# Patient Record
Sex: FEMALE | URBAN - METROPOLITAN AREA
[De-identification: names, ages, dates, MRNs, and addresses within clinical notes are randomized per-mention and may not be internally consistent; named-entity substitution may affect disease eponyms.]

---

## 2020-04-21 DIAGNOSIS — Z01.84 ANTIBODY RESPONSE EXAMINATION: ICD-10-CM

## 2020-05-21 DIAGNOSIS — Z01.84 ANTIBODY RESPONSE EXAMINATION: ICD-10-CM

## 2020-06-20 DIAGNOSIS — Z01.84 ANTIBODY RESPONSE EXAMINATION: ICD-10-CM

## 2020-07-20 DIAGNOSIS — Z01.84 ANTIBODY RESPONSE EXAMINATION: ICD-10-CM

## 2020-08-19 DIAGNOSIS — Z01.84 ANTIBODY RESPONSE EXAMINATION: ICD-10-CM

## 2020-09-18 DIAGNOSIS — Z01.84 ANTIBODY RESPONSE EXAMINATION: ICD-10-CM

## 2020-10-18 DIAGNOSIS — Z01.84 ANTIBODY RESPONSE EXAMINATION: ICD-10-CM

## 2020-11-17 DIAGNOSIS — Z01.84 ANTIBODY RESPONSE EXAMINATION: ICD-10-CM

## 2021-12-22 ENCOUNTER — HOSPITAL ENCOUNTER (EMERGENCY)
Facility: OTHER | Age: 41
Discharge: HOME | End: 2021-12-22
Attending: EMERGENCY MEDICINE
Payer: MEDICAID

## 2021-12-22 VITALS
BODY MASS INDEX: 32.14 KG/M2 | OXYGEN SATURATION: 99 % | HEART RATE: 84 BPM | RESPIRATION RATE: 16 BRPM | TEMPERATURE: 99 F | HEIGHT: 66 IN | WEIGHT: 200 LBS | DIASTOLIC BLOOD PRESSURE: 70 MMHG | SYSTOLIC BLOOD PRESSURE: 132 MMHG

## 2021-12-22 DIAGNOSIS — R10.13 EPIGASTRIC ABDOMINAL PAIN: Primary | ICD-10-CM

## 2021-12-22 LAB
ALBUMIN SERPL BCP-MCNC: 3.6 G/DL (ref 3.5–5.2)
ALP SERPL-CCNC: 72 U/L (ref 55–135)
ALT SERPL W/O P-5'-P-CCNC: 16 U/L (ref 10–44)
ANION GAP SERPL CALC-SCNC: 11 MMOL/L (ref 8–16)
AST SERPL-CCNC: 17 U/L (ref 10–40)
BASOPHILS # BLD AUTO: 0.03 K/UL (ref 0–0.2)
BASOPHILS NFR BLD: 0.4 % (ref 0–1.9)
BILIRUB SERPL-MCNC: 0.6 MG/DL (ref 0.1–1)
BUN SERPL-MCNC: 6 MG/DL (ref 6–20)
CALCIUM SERPL-MCNC: 8.8 MG/DL (ref 8.7–10.5)
CHLORIDE SERPL-SCNC: 103 MMOL/L (ref 95–110)
CO2 SERPL-SCNC: 23 MMOL/L (ref 23–29)
CREAT SERPL-MCNC: 0.7 MG/DL (ref 0.5–1.4)
DIFFERENTIAL METHOD: ABNORMAL
EOSINOPHIL # BLD AUTO: 0 K/UL (ref 0–0.5)
EOSINOPHIL NFR BLD: 0.4 % (ref 0–8)
ERYTHROCYTE [DISTWIDTH] IN BLOOD BY AUTOMATED COUNT: 14.2 % (ref 11.5–14.5)
EST. GFR  (AFRICAN AMERICAN): >60 ML/MIN/1.73 M^2
EST. GFR  (NON AFRICAN AMERICAN): >60 ML/MIN/1.73 M^2
GLUCOSE SERPL-MCNC: 96 MG/DL (ref 70–110)
HCT VFR BLD AUTO: 43.3 % (ref 37–48.5)
HCV AB SERPL QL IA: NEGATIVE
HGB BLD-MCNC: 14 G/DL (ref 12–16)
HIV 1+2 AB+HIV1 P24 AG SERPL QL IA: NEGATIVE
IMM GRANULOCYTES # BLD AUTO: 0.06 K/UL (ref 0–0.04)
IMM GRANULOCYTES NFR BLD AUTO: 0.8 % (ref 0–0.5)
LIPASE SERPL-CCNC: 4 U/L (ref 4–60)
LYMPHOCYTES # BLD AUTO: 0.7 K/UL (ref 1–4.8)
LYMPHOCYTES NFR BLD: 9.3 % (ref 18–48)
MCH RBC QN AUTO: 30.1 PG (ref 27–31)
MCHC RBC AUTO-ENTMCNC: 32.3 G/DL (ref 32–36)
MCV RBC AUTO: 93 FL (ref 82–98)
MONOCYTES # BLD AUTO: 0.9 K/UL (ref 0.3–1)
MONOCYTES NFR BLD: 12 % (ref 4–15)
NEUTROPHILS # BLD AUTO: 5.4 K/UL (ref 1.8–7.7)
NEUTROPHILS NFR BLD: 77.1 % (ref 38–73)
NRBC BLD-RTO: 0 /100 WBC
PLATELET # BLD AUTO: 217 K/UL (ref 150–450)
PMV BLD AUTO: 11.2 FL (ref 9.2–12.9)
POTASSIUM SERPL-SCNC: 3.7 MMOL/L (ref 3.5–5.1)
PROT SERPL-MCNC: 7.1 G/DL (ref 6–8.4)
RBC # BLD AUTO: 4.65 M/UL (ref 4–5.4)
SODIUM SERPL-SCNC: 137 MMOL/L (ref 136–145)
WBC # BLD AUTO: 7.06 K/UL (ref 3.9–12.7)

## 2021-12-22 PROCEDURE — 80053 COMPREHEN METABOLIC PANEL: CPT | Performed by: EMERGENCY MEDICINE

## 2021-12-22 PROCEDURE — 85025 COMPLETE CBC W/AUTO DIFF WBC: CPT | Performed by: EMERGENCY MEDICINE

## 2021-12-22 PROCEDURE — 83690 ASSAY OF LIPASE: CPT | Performed by: EMERGENCY MEDICINE

## 2021-12-22 PROCEDURE — 86803 HEPATITIS C AB TEST: CPT | Performed by: EMERGENCY MEDICINE

## 2021-12-22 PROCEDURE — 99284 EMERGENCY DEPT VISIT MOD MDM: CPT | Mod: 25

## 2021-12-22 PROCEDURE — 36000 PLACE NEEDLE IN VEIN: CPT

## 2021-12-22 PROCEDURE — 25000003 PHARM REV CODE 250: Performed by: EMERGENCY MEDICINE

## 2021-12-22 PROCEDURE — 87389 HIV-1 AG W/HIV-1&-2 AB AG IA: CPT | Performed by: EMERGENCY MEDICINE

## 2021-12-22 RX ORDER — ONDANSETRON 4 MG/1
4 TABLET, ORALLY DISINTEGRATING ORAL
Status: COMPLETED | OUTPATIENT
Start: 2021-12-22 | End: 2021-12-22

## 2021-12-22 RX ORDER — SUCRALFATE 1 G/10ML
1 SUSPENSION ORAL
Status: COMPLETED | OUTPATIENT
Start: 2021-12-22 | End: 2021-12-22

## 2021-12-22 RX ORDER — FAMOTIDINE 20 MG/1
20 TABLET, FILM COATED ORAL 2 TIMES DAILY
Qty: 20 TABLET | Refills: 0 | Status: SHIPPED | OUTPATIENT
Start: 2021-12-22 | End: 2022-12-22

## 2021-12-22 RX ORDER — OMEPRAZOLE 20 MG/1
20 CAPSULE, DELAYED RELEASE ORAL DAILY
Qty: 30 CAPSULE | Refills: 0 | Status: SHIPPED | OUTPATIENT
Start: 2021-12-22 | End: 2022-01-21

## 2021-12-22 RX ADMIN — SUCRALFATE 1 G: 1 SUSPENSION ORAL at 09:12

## 2021-12-22 RX ADMIN — ALUMINUM HYDROXIDE, MAGNESIUM HYDROXIDE, DIMETHICONE 50 ML: 200; 200; 20 LIQUID ORAL at 08:12

## 2021-12-22 RX ADMIN — ONDANSETRON 4 MG: 4 TABLET, ORALLY DISINTEGRATING ORAL at 08:12

## 2021-12-22 NOTE — ED PROVIDER NOTES
"SCRIBE #1 NOTE: I, Little Bolanos, am scribing for, and in the presence of, Yosef Jarrell DO.       Source of History:  The patient.    Chief complaint:  Abdominal Pain (Abdominal discomfort X2 days. N/V. Received booster shot yesterday. Also complaining of knee pain x2 weeks.)      HPI:  Francisca Wood is a 41 y.o. female presenting with epigastric abdominal pain x 1 day. Patient reports that she was at home eating yesterday morning when the pain began. She describes her pain today as a burning sensation that she rates 10/10. She reports N/V as well. She denies fever. She denies any abnormal urination or irregular bowel movements. She has no history of abdominal surgeries.    This is the extent to the patients complaints today here in the emergency department.    ROS: As per HPI and below:  Constitutional: No fever.  No chills.  Eyes: No visual changes.  ENT: No sore throat. No ear pain    Cardiovascular: No chest pain.  Respiratory: No shortness of breath.  GI: +Abdominal pain. +Nausea and vomiting. No constipation. No diarrhea.  Genitourinary: No abnormal urination.  Neurologic: No headache. No focal weakness.  No numbness.  MSK: no back pain.  Integument: No rashes or lesions.  Hematologic: No easy bruising.  Endocrine: No excessive thirst or urination.    Review of patient's allergies indicates:  No Known Allergies    PMH:  As per HPI and below:  No past medical history on file.  No past surgical history on file.         Physical Exam:    /70   Pulse 84   Temp 98.5 °F (36.9 °C) (Oral)   Resp 16   Ht 5' 6" (1.676 m)   Wt 90.7 kg (200 lb)   SpO2 99%   BMI 32.28 kg/m²   Nursing note and vital signs reviewed.  Constitutional: No acute distress.  Eyes: No conjunctival injection.Extraocular muscles are intact.  ENT: Oropharynx clear.  Normal phonation.   Cardiovascular: Regular rate and rhythm.  No murmurs. No gallops. No rubs  Respiratory: Clear to auscultation bilaterally.  Good air movement.  No " wheezes.  No rhonchi. No rales. No accessory muscle use..  Abdomen: Soft. Not distended. No guarding.  No rebound. Non-peritoneal. Epigastric and RUQ tenderness to palpation. Negative Lisa's sign. Negative McBurney's point tenderness.  Musculoskeletal: Good range of motion all joints.  No deformities.  Neck supple.  No meningismus.  Skin: No rashes seen.  Good turgor.  No abrasions.  No ecchymoses.  Neurologic: Motor intact.  Sensation intact.  No ataxia. No focal neurological deficits.  Psych: Appropriate, conversant    Labs that have been ordered have been independently reviewed and interpreted by myself.    I decided to obtain the patient's medical records.  Summary of Medical Records:      Initial Impression/ Differential Dx:  Gastritis, cholelithiasis, do not suspect pancreatitis cholecystitis do not suspect ACS or pneumonia    MDM:    41 y.o. female with symptoms of gastritis.  Will give a GI cocktail, get labs and ultrasound to rule out acute cholecystitis and plan for discharge pending results.    ED Course as of 12/22/21 0927   Wed Dec 22, 2021   0834 WBC: 7.06 [SM]   0834 Hemoglobin: 14.0 [SM]   0902 Lipase: 4 [SM]   0902 Comprehensive metabolic panel  Normal [SM]   0919 Upon re-evaluation patient is feeling better after the GI cocktail.  Pending the results the ultrasound will plan for discharge. [SM]   0926 US Abdomen Limited  Ultrasound shows no significant abnormality [SM]   0926 No further workup indicated based on their complaints or examination today.    Patient to be discharged home in stable condition. Diagnosis and treatment plan explained to patient and/or family member who is at bedside. I have answered all questions and the patient is satisfied with the plan of care. Strict return precautions given. The patient demonstrates understanding of the care plan. [SM]      ED Course User Index  [SM] Yosef Jarrell DO                Scribe Attestation:   Scribe #1: I performed the above scribed  service and the documentation accurately describes the services I performed. I attest to the accuracy of the note.    Physician Attestation for Scribe: I, Dr Jarrell, reviewed documentation as scribed in my presence, which is both accurate and complete.    Diagnostic Impression:    1. Epigastric abdominal pain         ED Disposition Condition    Discharge Stable          ED Prescriptions     Medication Sig Dispense Start Date End Date Auth. Provider    famotidine (PEPCID) 20 MG tablet Take 1 tablet (20 mg total) by mouth 2 (two) times daily. 20 tablet 12/22/2021 12/22/2022 Yosef Jarrell,     omeprazole (PRILOSEC) 20 MG capsule Take 1 capsule (20 mg total) by mouth once daily. 30 capsule 12/22/2021 1/21/2022 Yosef Jarrell DO        Follow-up Information     Follow up With Specialties Details Why Contact Info    [facility]    [address]  [place] [address]  [phone]             Yosef Jarrell DO  12/22/21 2387

## 2021-12-22 NOTE — ED NOTES
Pt in er 7 c.o epigastric pain onset yesterday.  Vomit x 1 today.  Denies urinary symptoms.  Pt had booster yesterday.  Pt also c.o left knee pain onset 1 week ago mild tenderness on palpation to epigastric area.  No obvious swelling or deformity noted to left knee  AAO x 3 nadn skin w.d

## 2021-12-22 NOTE — DISCHARGE INSTRUCTIONS
Use Maalox or Mylanta over-the-counter.  Take 30 mL, 30 minutes prior to meals and 30 minutes prior to bedtime.

## 2022-02-16 ENCOUNTER — HOSPITAL ENCOUNTER (EMERGENCY)
Facility: OTHER | Age: 42
Discharge: HOME | End: 2022-02-16
Attending: EMERGENCY MEDICINE
Payer: MEDICAID

## 2022-02-16 VITALS
HEART RATE: 61 BPM | RESPIRATION RATE: 18 BRPM | SYSTOLIC BLOOD PRESSURE: 110 MMHG | DIASTOLIC BLOOD PRESSURE: 76 MMHG | OXYGEN SATURATION: 99 % | HEIGHT: 66 IN | BODY MASS INDEX: 32.14 KG/M2 | WEIGHT: 200 LBS | TEMPERATURE: 98 F

## 2022-02-16 DIAGNOSIS — M25.569 KNEE PAIN: ICD-10-CM

## 2022-02-16 DIAGNOSIS — M25.562 ACUTE PAIN OF LEFT KNEE: Primary | ICD-10-CM

## 2022-02-16 DIAGNOSIS — M25.462 EFFUSION OF BURSA OF LEFT KNEE: ICD-10-CM

## 2022-02-16 LAB
B-HCG UR QL: NEGATIVE
CTP QC/QA: YES

## 2022-02-16 PROCEDURE — 96372 THER/PROPH/DIAG INJ SC/IM: CPT

## 2022-02-16 PROCEDURE — 99284 EMERGENCY DEPT VISIT MOD MDM: CPT | Mod: 25

## 2022-02-16 PROCEDURE — 63600175 PHARM REV CODE 636 W HCPCS: Performed by: PHYSICIAN ASSISTANT

## 2022-02-16 PROCEDURE — 81025 URINE PREGNANCY TEST: CPT | Performed by: PHYSICIAN ASSISTANT

## 2022-02-16 RX ORDER — KETOROLAC TROMETHAMINE 30 MG/ML
30 INJECTION, SOLUTION INTRAMUSCULAR; INTRAVENOUS
Status: COMPLETED | OUTPATIENT
Start: 2022-02-16 | End: 2022-02-16

## 2022-02-16 RX ORDER — NAPROXEN 500 MG/1
500 TABLET ORAL 2 TIMES DAILY WITH MEALS
Qty: 20 TABLET | Refills: 0 | Status: SHIPPED | OUTPATIENT
Start: 2022-02-16 | End: 2022-02-26

## 2022-02-16 RX ADMIN — KETOROLAC TROMETHAMINE 30 MG: 30 INJECTION, SOLUTION INTRAMUSCULAR at 10:02

## 2022-02-16 NOTE — DISCHARGE INSTRUCTIONS
Use an ice pack to the region.  Use Ace bandage for swelling and support.  Use crutches as needed.  Follow up with the Orthopedics provided to you.

## 2022-02-16 NOTE — ED PROVIDER NOTES
Encounter Date: 2/16/2022       History     Chief Complaint   Patient presents with    Knee Pain     Pt c.o left knee pain onset for weeks. Pt was seen here for same complaint.  Pt denies injury.  Pt has not followed up with ortho. AAO x 3 nadn skin w.d pt ambulatory without difficulty no obvious deformity or swelling noted to left knee      41-year-old female presents ER for evaluation of left knee pain.  Patient reports that symptoms have been ongoing for the last several weeks.  She has not followed up with any orthopedist or primary care for this.  She does not recall any injury or trauma.  She denies any fevers or chills.  Denies any redness, drainage at the site.  No fevers or chills.  No prior history of septic arthritis.  She has not taken any medicine for her symptoms.  Reports she stands all day for work.    The history is provided by the patient.     Review of patient's allergies indicates:  No Known Allergies  No past medical history on file.  No past surgical history on file.  No family history on file.     Review of Systems   Constitutional: Negative for chills and fever.   Eyes: Negative for visual disturbance.   Respiratory: Negative for shortness of breath.    Cardiovascular: Negative for chest pain.   Gastrointestinal: Negative for nausea and vomiting.   Genitourinary: Negative for dysuria and flank pain.   Musculoskeletal: Positive for arthralgias. Negative for myalgias.   Skin: Negative for rash.   Allergic/Immunologic: Negative for immunocompromised state.   Neurological: Negative for weakness and numbness.   Hematological: Does not bruise/bleed easily.   Psychiatric/Behavioral: Negative for confusion.       Physical Exam     Initial Vitals [02/16/22 0921]   BP Pulse Resp Temp SpO2   105/64 62 18 98.4 °F (36.9 °C) 97 %      MAP       --         Physical Exam    Vitals reviewed.  Constitutional: She appears well-developed and well-nourished. She is not diaphoretic. No distress.   HENT:   Head:  Normocephalic and atraumatic.   Eyes: Conjunctivae and EOM are normal.   Neck: Neck supple.   Pulmonary/Chest: No respiratory distress.   Musculoskeletal:         General: Normal range of motion.      Cervical back: Neck supple.      Left knee: Swelling and effusion present. Normal range of motion. Tenderness ( diffuse) present.     Neurological: She is alert and oriented to person, place, and time.         ED Course   Procedures  Labs Reviewed   POCT URINE PREGNANCY          Imaging Results          X-Ray Knee 3 View Left (Final result)  Result time 02/16/22 11:51:35    Final result by Jerome Vicente MD (02/16/22 11:51:35)                 Impression:      As above.      Electronically signed by: Jerome Vicente  Date:    02/16/2022  Time:    11:51             Narrative:    EXAMINATION:  XR KNEE 3 VIEW LEFT    CLINICAL HISTORY:  Pain in unspecified knee    TECHNIQUE:  AP, lateral, and Merchant views of the left knee were performed.    COMPARISON:  None    FINDINGS:  No fracture.  No malalignment.  No definite narrowing of the tibiofemoral or patellofemoral articulations.  Obvious suprapatellar bursal effusion.  Clinical correlation.  No prepatellar soft tissue swelling.  Some soft tissue calcification seen anteriorly on the lateral exam adjacent to upper tibia.                                 Medications   ketorolac injection 30 mg (30 mg Intramuscular Given 2/16/22 1055)           APC / Resident Notes:   Patient in the ER promptly upon arrival.  She is afebrile, no acute distress.  Physical examination reveals diffuse tenderness on palpation to the left anterior knee.  There is associated effusion.  No erythema or warmth to touch.  Range of motion of the knee is intact but slightly painful.    X-ray of the knee obtained.  Patient given Toradol for pain control.    X-ray of the knee is unremarkable for acute fracture or dislocation.  There is suprapatellar bursal effusion.    Exam was concerning for septic arthritis.   Pain likely from the effusion.  Patient was given Ace bandage, crutches and will prescribed home on naproxen to use as directed.  Patient was given follow-up information with Orthopedics.  Advised ice elevate and rest.  Given strict return precautions ED.  Stable for discharge and close follow-up at this time.     Disclaimer: This note has been generated using voice-recognition software. There may be typographical errors that have been missed during proof-reading.                   Clinical Impression:   Final diagnoses:  [M25.569] Knee pain  [M25.562] Acute pain of left knee (Primary)  [M25.462] Effusion of bursa of left knee          ED Disposition Condition    Discharge Stable        ED Prescriptions     Medication Sig Dispense Start Date End Date Auth. Provider    naproxen (NAPROSYN) 500 MG tablet Take 1 tablet (500 mg total) by mouth 2 (two) times daily with meals. for 10 days 20 tablet 2/16/2022 2/26/2022 Poppy Faust PA-C        Follow-up Information     Follow up With Specialties Details Why Contact [facility]cology, Orthopedic Surgery, Genetics, Physical Medicine and Rehabilitation, Occupational Therapy, Radiology   [address] [facility] [address]  [phone]             Poppy Faust PA-C  02/16/22 1352

## 2022-02-16 NOTE — ED NOTES
Presented to ed with Left knee pain x 3week.  Pt denies injury.  Left knee swollen and warm to touch.  Pt a/a/ox3

## 2022-02-17 ENCOUNTER — PES CALL (OUTPATIENT)
Dept: ADMINISTRATIVE | Facility: CLINIC | Age: 42
End: 2022-02-17
Payer: MEDICAID

## 2023-08-07 ENCOUNTER — HOSPITAL ENCOUNTER (EMERGENCY)
Facility: OTHER | Age: 43
Discharge: HOME | End: 2023-08-07
Attending: EMERGENCY MEDICINE
Payer: COMMERCIAL

## 2023-08-07 VITALS
RESPIRATION RATE: 20 BRPM | DIASTOLIC BLOOD PRESSURE: 76 MMHG | HEART RATE: 80 BPM | SYSTOLIC BLOOD PRESSURE: 143 MMHG | OXYGEN SATURATION: 98 % | TEMPERATURE: 98 F

## 2023-08-07 DIAGNOSIS — M25.562 ACUTE PAIN OF LEFT KNEE: Primary | ICD-10-CM

## 2023-08-07 DIAGNOSIS — R52 PAIN: ICD-10-CM

## 2023-08-07 PROCEDURE — 25000003 PHARM REV CODE 250: Performed by: NURSE PRACTITIONER

## 2023-08-07 PROCEDURE — 99284 EMERGENCY DEPT VISIT MOD MDM: CPT

## 2023-08-07 RX ORDER — METHOCARBAMOL 500 MG/1
1000 TABLET, FILM COATED ORAL 3 TIMES DAILY
Qty: 30 TABLET | Refills: 0 | Status: SHIPPED | OUTPATIENT
Start: 2023-08-07 | End: 2023-08-12

## 2023-08-07 RX ORDER — DICLOFENAC SODIUM 10 MG/G
2 GEL TOPICAL 4 TIMES DAILY
Qty: 100 G | Refills: 0 | Status: SHIPPED | OUTPATIENT
Start: 2023-08-07

## 2023-08-07 RX ORDER — KETOROLAC TROMETHAMINE 10 MG/1
10 TABLET, FILM COATED ORAL
Status: COMPLETED | OUTPATIENT
Start: 2023-08-07 | End: 2023-08-07

## 2023-08-07 RX ORDER — ORPHENADRINE CITRATE 100 MG/1
100 TABLET, EXTENDED RELEASE ORAL
Status: COMPLETED | OUTPATIENT
Start: 2023-08-07 | End: 2023-08-07

## 2023-08-07 RX ORDER — NAPROXEN 375 MG/1
375 TABLET ORAL 2 TIMES DAILY WITH MEALS
Qty: 60 TABLET | Refills: 0 | Status: SHIPPED | OUTPATIENT
Start: 2023-08-07

## 2023-08-07 RX ADMIN — KETOROLAC TROMETHAMINE 10 MG: 10 TABLET, FILM COATED ORAL at 03:08

## 2023-08-07 RX ADMIN — ORPHENADRINE CITRATE 100 MG: 100 TABLET, EXTENDED RELEASE ORAL at 03:08

## 2023-08-07 NOTE — ED TRIAGE NOTES
Patient present to ED C/O left knee pain 10/10. Patient states pain is constant with an onset of 2 weeks ago. Patient denies taking medication prior to arrival, SOB, cough, and dizziness.

## 2023-08-07 NOTE — ED PROVIDER NOTES
Source of History:  Patient     Chief complaint:  Knee Pain (Non traumatic left knee pain, pt ambulatory to triage3)      HPI:  Francisca Wood is a 42 y.o. female presenting to the emergency department with complaint of atraumatic left pain that began 2 days ago, worse over the last day which prompted her to present to the emergency department.  She is not tried any over-the-counter medications.  Reports history of similar a few years ago.  Never followed up with ortho      This is the extent to the patients complaints today here in the emergency department.    PMH:  As per HPI and below:  History reviewed. No pertinent past medical history.  History reviewed. No pertinent surgical history.    Social History     Tobacco Use    Smoking status: Some Days     Current packs/day: 0.00     Types: Cigarettes   Substance Use Topics    Alcohol use: Never    Drug use: Never       Review of patient's allergies indicates:  No Known Allergies    ROS: As per HPI and below:  General: No fever.  No chills.  Eyes: No visual changes.   ENT: No sore throat. No ear pain.  Urinary: No abnormal urination.  MSK:  Knee pain  Integument:  No rashes or lesions.       Physical Exam:    BP (!) 143/76   Pulse 80   Temp 98.3 °F (36.8 °C) (Oral)   Resp 20   LMP 07/28/2023 (Approximate)   SpO2 98%   Breastfeeding No   Vitals:    08/07/23 1315   BP: (!) 143/76   Pulse: 80   Resp: 20   Temp: 98.3 °F (36.8 °C)   TempSrc: Oral   SpO2: 98%       Nursing note and vital signs reviewed.  Appearance: No acute distress.  Eyes: No conjunctival injection.  Extraocular muscles are intact.  ENT: Normal phonation.  Cardio:  DP +2 bilaterally  Musculoskeletal:  Anterior left knee mild tenderness over the patella, there is no bruising, swelling or erythema.  Full range motion of the knee is noted however she does have pain with movement.  Skin:  No rashes seen.  Good turgor.  No abrasions.  No ecchymoses.  Mental Status:  Alert and oriented x 3.  Appropriate,  conversant.      Labs Reviewed - No data to display    X-Ray Knee 1 or 2 View Left   Final Result      See above         Electronically signed by: Wes Chirinos MD   Date:    08/07/2023   Time:    14:08            Initial Impression/ Differential Dx:  Differential Diagnosis includes, but is not limited to:  Knee contusion, sprain, fracture, referred pain from hip or ankle, lumbar radiculopathy, ITB syndrome, septic arthritis, osteoarthritis, effusion, meniscus injury, cellulitis    MDM:    42 y.o. female with atraumatic left knee pain x2 days, x-rays reveal no acute fractures.  Discussed with the patient needs follow-up with orthopedics and I placed a referral for the patient.  Will discharge home with anti-inflammatories and muscle relaxers.  She was agreeable this plan.  Ace wrap was provided for comfort.         Diagnostic Impression:    1. Acute pain of left knee    2. Pain         ED Disposition Condition    Discharge Stable            ED Prescriptions       Medication Sig Dispense Start Date End Date Auth. Provider    naproxen (NAPROSYN) 375 MG tablet Take 1 tablet (375 mg total) by mouth 2 (two) times daily with meals. 60 tablet 8/7/2023 -- Liz Sanchez FNP    diclofenac sodium (VOLTAREN) 1 % Gel Apply 2 g topically 4 (four) times daily. 100 g 8/7/2023 -- Liz Sanchez FNP    methocarbamoL (ROBAXIN) 500 MG Tab Take 2 tablets (1,000 mg total) by mouth 3 (three) times daily. for 5 days 30 tablet 8/7/2023 8/12/2023 Liz Sanchez FNP          Follow-up Information       Follow up With Specialties Details Why Contact Info    [facility] - Emergency Dept Emergency Medicine Go to  If symptoms worsen [address]  [facility] [id number] [phone]               Liz Sanchez FNP  08/07/23 6561

## 2023-08-07 NOTE — FIRST PROVIDER EVALUATION
Emergency Department TeleTriage Encounter Note      CHIEF COMPLAINT    Chief Complaint   Patient presents with    Knee Pain     Non traumatic left knee pain, pt ambulatory to triage3       VITAL SIGNS   Initial Vitals [08/07/23 1315]   BP Pulse Resp Temp SpO2   (!) 143/76 80 20 98.3 °F (36.8 °C) 98 %      MAP       --            ALLERGIES    Review of patient's allergies indicates:  No Known Allergies    PROVIDER TRIAGE NOTE  42 year old female presents to the ER with complaints of left knee pain x 2 weeks. Reports no injury. Hx of similar pain in the past that waxes and wanes. No Leg swelling or calf pain. Reports no redness or warmth.     Exam: Aaox3, respirations even and non labored, appears in no acute distress.           ORDERS  Labs Reviewed - No data to display    ED Orders (720h ago, onward)      None              Virtual Visit Note: The provider triage portion of this emergency department evaluation and documentation was performed via [employer], a HIPAA-compliant telemedicine application, in concert with a tele-presenter in the room. A face to face patient evaluation with one of my colleagues will occur once the patient is placed in an emergency department room.      DISCLAIMER: This note was prepared with [employer] voice recognition transcription software. Garbled syntax, mangled pronouns, and other bizarre constructions may be attributed to that software system.

## 2023-08-24 DIAGNOSIS — M25.562 ACUTE PAIN OF LEFT KNEE: Primary | ICD-10-CM
